# Patient Record
Sex: FEMALE | Race: WHITE | NOT HISPANIC OR LATINO | Employment: PART TIME | URBAN - METROPOLITAN AREA
[De-identification: names, ages, dates, MRNs, and addresses within clinical notes are randomized per-mention and may not be internally consistent; named-entity substitution may affect disease eponyms.]

---

## 2024-03-04 ENCOUNTER — APPOINTMENT (OUTPATIENT)
Dept: OBSTETRICS AND GYNECOLOGY | Facility: CLINIC | Age: 34
End: 2024-03-04

## 2024-03-29 ENCOUNTER — APPOINTMENT (OUTPATIENT)
Dept: OBSTETRICS AND GYNECOLOGY | Facility: CLINIC | Age: 34
End: 2024-03-29

## 2024-05-14 ENCOUNTER — OFFICE VISIT (OUTPATIENT)
Dept: OBSTETRICS AND GYNECOLOGY | Facility: CLINIC | Age: 34
End: 2024-05-14

## 2024-05-14 VITALS
WEIGHT: 263 LBS | BODY MASS INDEX: 37.65 KG/M2 | SYSTOLIC BLOOD PRESSURE: 124 MMHG | HEIGHT: 70 IN | DIASTOLIC BLOOD PRESSURE: 80 MMHG

## 2024-05-14 DIAGNOSIS — R10.2 PELVIC PAIN: Primary | ICD-10-CM

## 2024-05-14 DIAGNOSIS — Z11.51 SCREENING FOR HUMAN PAPILLOMAVIRUS (HPV): ICD-10-CM

## 2024-05-14 DIAGNOSIS — R10.31 RIGHT LOWER QUADRANT ABDOMINAL PAIN: ICD-10-CM

## 2024-05-14 DIAGNOSIS — Z01.419 WELL FEMALE EXAM WITH ROUTINE GYNECOLOGICAL EXAM: ICD-10-CM

## 2024-05-14 DIAGNOSIS — R22.32 AXILLARY MASS, LEFT: ICD-10-CM

## 2024-05-14 PROCEDURE — 1036F TOBACCO NON-USER: CPT | Performed by: NURSE PRACTITIONER

## 2024-05-14 PROCEDURE — 87624 HPV HI-RISK TYP POOLED RSLT: CPT

## 2024-05-14 PROCEDURE — 99385 PREV VISIT NEW AGE 18-39: CPT | Performed by: NURSE PRACTITIONER

## 2024-05-14 PROCEDURE — 88175 CYTOPATH C/V AUTO FLUID REDO: CPT

## 2024-05-14 PROCEDURE — 87491 CHLMYD TRACH DNA AMP PROBE: CPT

## 2024-05-14 PROCEDURE — 87591 N.GONORRHOEAE DNA AMP PROB: CPT

## 2024-05-14 NOTE — PROGRESS NOTES
Subjective   Giselle Tariq is a 34 y.o. female who is here for a routine exam. Periods are regular every 21-28 days, lasting 6 days. Pt states at the heaviest she is changing a pad every 2-3 hours. Dysmenorrhea:severe, occurring throughout cycle. Pain mostly associate with ovulation pain. Cyclic symptoms include none. No intermenstrual bleeding, spotting, or discharge.  C/O RLQ pain with ovulation and pain that radiates to her right hip.   C/O Urgency and possible urinary retention. No fever/no chills/no N&V. This symptoms has been present for months.   Hx of no GYN surgeries.   Hx of colposcopy years ago.   No hx of pregnancies.   Single partner for 7+ years.   Current contraception: condoms    History of abnormal Pap smear: yes - [id number]  Family history of uterine or ovarian cancer: no  Regular self breast exam: no    History of abnormal mammogram: no  Family history of breast cancer: no    Last pap: 2019 WNL, 2017 WNL  Previous hx of abnormal paps     Review of Systems:    Constitutional: Negative.    HENT: Negative.     Eyes: Negative.    Respiratory: Negative.     Cardiovascular: Negative.    Gastrointestinal: Negative.    Endocrine: Negative.    Genitourinary: Negative.    Musculoskeletal: Negative.    Skin: Negative.    Allergic/Immunologic: Negative.    Neurological: Negative.    Hematological: Negative.    Psychiatric/Behavioral: Negative.       Past Medical History:   Diagnosis Date    Abnormal Pap smear of cervix Unsure, its been quite a few years    Encounter for screening for malignant neoplasm of cervix     Encounter for screening for malignant neoplasm of cervix    Fatty (change of) liver, not elsewhere classified     Non-alcoholic fatty liver disease      Past Surgical History:   Procedure Laterality Date    OTHER SURGICAL HISTORY  11/10/2015    Treatment Of Foot Fracture      Menstrual History:  OB History          0    Para   0    Term   0       0    AB   0    Living   0          "SAB   0    IAB   0    Ectopic   0    Multiple   0    Live Births   0                  Patient's last menstrual period was 05/08/2024 (exact date).         Review of Systems    Objective   /80   Ht 1.778 m (5' 10\")   Wt 119 kg (263 lb)   LMP 05/08/2024 (Exact Date)   BMI 37.74 kg/m²     Exam:   Constitutional; alert with no acute distress.  Well-nourished.      Neck: No asymmetry noted.  Thyroid without enlargement.  No palpable nodules or masses of concern.    Cardiovascular: Heart regular rate and rhythm, normal S1 and S2    Pulmonary: No respiratory distress, lungs clear to auscultate bilaterally    Chest/breast exam: Appearance bilaterally normal, without asymmetry of concern, without skin lesions or nipple discharge.  Palpation of the right breast-no palpable masses no lymphadenopathy of the axilla. Left breast axially tail with palpable mass 1 cm, questionable lymph node verses breast mass. Tender to palpate. 1 cm x 1cm, firm     Abdomen: Soft, nontender, no abdominal masses palpated    Genitourinary:  Palpation of the lymph nodes of the groin-no inguinal lymphadenopathy  External genitalia/perianal: Without lesions normal in appearance   Urethra: In appearance without lesions Bladder: non-tender to palpate  Vagina: Without lesions including Bartholin, urethra, [place]'s glands within normal limits all WNL   Cervix: Normal in appearance without lesions, no cervical motion tenderness to palpation  Uterus: Without enlargement, mobile, nontender to palpate  Bilateral adnexa:  without masses, tender to palpate bilaterally, Right > Left     Skin: Normal skin color and pigmentation    Psychiatric: Alert and oriented x3. Affect normal to patient baseline.  Mood: appropriate       Assessment/Plan   Diagnoses and all orders for this visit:  Pelvic pain  -     US PELVIS TRANSABDOMINAL WITH TRANSVAGINAL; Future  -     THINPREP PAP  Well female exam with routine gynecological exam  -     THINPREP PAP  Screening for " human papillomavirus (HPV)  -     THINPREP PAP  Right lower quadrant abdominal pain  -     US PELVIS TRANSABDOMINAL WITH TRANSVAGINAL; Future  Axillary mass, left  -     BI mammo bilateral diagnostic tomosynthesis; Future  -     BI US breast limited left; Future      No follow-ups on file.     Pap plan: Pending pap, plan co-testing every 5 years   STI screening annually and prn if needed   Contraception discussed  Safe sex discussed   RTO 1 year annual exam, prn   Mammogram screening evaluation     ABD pain:  Pending ultrasound   Follow up visit with Physician   R/O GC/CT with pap     Breast lump- left breast axilla   Follow up in 3 months pending WNL jackie Stevenson, APRN-CNM, APRN-CNP

## 2024-05-16 LAB
C TRACH RRNA SPEC QL NAA+PROBE: NEGATIVE
N GONORRHOEA DNA SPEC QL PROBE+SIG AMP: NEGATIVE

## 2024-05-28 ENCOUNTER — HOSPITAL ENCOUNTER (OUTPATIENT)
Dept: RADIOLOGY | Facility: HOSPITAL | Age: 34
Discharge: HOME | End: 2024-05-28

## 2024-05-28 ENCOUNTER — HOSPITAL ENCOUNTER (OUTPATIENT)
Dept: RADIOLOGY | Facility: HOSPITAL | Age: 34
Discharge: HOME | End: 2024-05-28
Payer: OTHER GOVERNMENT

## 2024-05-28 DIAGNOSIS — R22.32 AXILLARY MASS, LEFT: ICD-10-CM

## 2024-05-28 DIAGNOSIS — R10.2 PELVIC PAIN: ICD-10-CM

## 2024-05-28 DIAGNOSIS — R10.31 RIGHT LOWER QUADRANT ABDOMINAL PAIN: ICD-10-CM

## 2024-05-28 PROCEDURE — 77062 BREAST TOMOSYNTHESIS BI: CPT

## 2024-05-28 PROCEDURE — 76642 ULTRASOUND BREAST LIMITED: CPT | Mod: LEFT SIDE | Performed by: RADIOLOGY

## 2024-05-28 PROCEDURE — 76856 US EXAM PELVIC COMPLETE: CPT

## 2024-05-28 PROCEDURE — 76830 TRANSVAGINAL US NON-OB: CPT | Performed by: STUDENT IN AN ORGANIZED HEALTH CARE EDUCATION/TRAINING PROGRAM

## 2024-05-28 PROCEDURE — 76856 US EXAM PELVIC COMPLETE: CPT | Performed by: STUDENT IN AN ORGANIZED HEALTH CARE EDUCATION/TRAINING PROGRAM

## 2024-05-28 PROCEDURE — 77062 BREAST TOMOSYNTHESIS BI: CPT | Mod: LEFT SIDE | Performed by: RADIOLOGY

## 2024-05-28 PROCEDURE — 77066 DX MAMMO INCL CAD BI: CPT | Mod: LEFT SIDE | Performed by: RADIOLOGY

## 2024-05-28 PROCEDURE — 76642 ULTRASOUND BREAST LIMITED: CPT | Mod: LT

## 2024-05-30 LAB

## 2024-06-10 ENCOUNTER — APPOINTMENT (OUTPATIENT)
Dept: OBSTETRICS AND GYNECOLOGY | Facility: CLINIC | Age: 34
End: 2024-06-10

## 2024-06-24 ENCOUNTER — APPOINTMENT (OUTPATIENT)
Dept: OBSTETRICS AND GYNECOLOGY | Facility: CLINIC | Age: 34
End: 2024-06-24

## 2024-08-12 ENCOUNTER — APPOINTMENT (OUTPATIENT)
Dept: OBSTETRICS AND GYNECOLOGY | Facility: CLINIC | Age: 34
End: 2024-08-12

## 2024-08-12 VITALS
HEIGHT: 70 IN | SYSTOLIC BLOOD PRESSURE: 124 MMHG | DIASTOLIC BLOOD PRESSURE: 88 MMHG | WEIGHT: 261 LBS | BODY MASS INDEX: 37.37 KG/M2

## 2024-08-12 DIAGNOSIS — R93.89 THICKENED ENDOMETRIUM: ICD-10-CM

## 2024-08-12 DIAGNOSIS — N64.4 BREAST PAIN, LEFT: ICD-10-CM

## 2024-08-12 DIAGNOSIS — R22.32 AXILLARY MASS, LEFT: Primary | ICD-10-CM

## 2024-08-12 DIAGNOSIS — N93.9 ABNORMAL UTERINE BLEEDING (AUB): ICD-10-CM

## 2024-08-12 DIAGNOSIS — N63.32 MASS OF AXILLARY TAIL OF LEFT BREAST: ICD-10-CM

## 2024-08-12 PROCEDURE — 1036F TOBACCO NON-USER: CPT | Performed by: NURSE PRACTITIONER

## 2024-08-12 PROCEDURE — 3008F BODY MASS INDEX DOCD: CPT | Performed by: NURSE PRACTITIONER

## 2024-08-12 PROCEDURE — 99214 OFFICE O/P EST MOD 30 MIN: CPT | Performed by: NURSE PRACTITIONER

## 2024-08-12 NOTE — PROGRESS NOTES
Giselle Tariq is a 34 y.o. year old female patient for follow up on previous mass on exam.  She presents for concern of a breast problem that was previously evaluated.   Previously noted: Left breast axially tail with palpable mass 1 cm, questionable lymph node verses breast mass. Tender to palpate. 1 cm x 1cm, firm   Since her last exam, she states she noticed breast pain  Previous imaging included: mammogram and ultrasound.   The reports from this evaluation were reviewed with patient.   She is agreeable to a follow up breast exam     In addition pt would like to review her pelvic ultrasound. She was unable to keep her follow up with the physician.     Cancer-related family history is not on file.   Past Medical History:   Diagnosis Date    Abnormal Pap smear of cervix Unsure, its been quite a few years    Encounter for screening for malignant neoplasm of cervix     Encounter for screening for malignant neoplasm of cervix    Fatty (change of) liver, not elsewhere classified     Non-alcoholic fatty liver disease      Past Surgical History:   Procedure Laterality Date    MULTIPLE TOOTH EXTRACTIONS      WISDOM TOOTH EXTRACTION          Exam:   Constitutional; alert with no acute distress.  Well-nourished.      Chest/breast exam: Appearance bilaterally normal, without asymmetry of concern, without skin lesions or nipple discharge.  Palpation of the right breast-no palpable masses no lymphadenopathy of the axilla. Left breast axially tail with palpable mass 1 cm, questionable lymph node verses breast mass. Tender to palpate. 1 cm x 1cm, firm     Abdomen: Soft, nontender, no abdominal masses palpated  Physical Exam  Genitourinary:   Breasts:     Right: Present. No swelling, bleeding, inverted nipple, mass, nipple discharge, skin change, tenderness or breast implant.      Left: Present. Mass and tenderness present. No swelling, bleeding, inverted nipple, nipple discharge, skin change or breast implant.   Chest:        Lymphadenopathy:      Upper Body:      Right upper body: No axillary adenopathy.        Same as above. C/W last exam lump smaller than 1x1cm however still palpable and pt has pain at this location.     Review of Systems     Problem List Items Addressed This Visit    None  Visit Diagnoses       Axillary mass, left    -  Primary    Relevant Orders    Referral to Breast Surgery    Mass of axillary tail of left breast        Relevant Orders    Referral to Breast Surgery    Breast pain, left        Relevant Orders    Referral to Breast Surgery    Thickened endometrium        Abnormal uterine bleeding (AUB)               * No order type specified *  No follow-ups on file.       Discussed RTO for EMB in 1-2 weeks.   Motrin prior to coming in.   Denies medical intervention for AUB. Pending nothing worsened. Declines IUD (Mirena). Declines surgical consult if everything is WNL.     Antonieta Stevenson, APRN-CNM, APRN-CNP

## 2024-08-28 ENCOUNTER — TELEPHONE (OUTPATIENT)
Dept: OBSTETRICS AND GYNECOLOGY | Facility: CLINIC | Age: 34
End: 2024-08-28
Payer: OTHER GOVERNMENT

## 2024-08-28 NOTE — TELEPHONE ENCOUNTER
PLEASE CALL EDWIGE BACK REGARDING WHAT MIGHT THE SURGEON DO AT APPT (SHE STATES SHE WILL TRY TO GET A HOLD OF THE REFERRED SURGEON ALSO. SHE STATES REECE WAS BI 1 IN MAY. SHE JUST MIGHT NOT BE COVERED FOR A BIOPSY DUE TO LEVEL. IS PT HAVING MORE PAIN?    FYI: ACCORDING TO [facility] PT'S SURGEON'S FOLLOW UP APPT NEEDS RESCHEDULED.    CALL [phone] EXT [phone]

## 2024-09-04 ENCOUNTER — APPOINTMENT (OUTPATIENT)
Dept: OBSTETRICS AND GYNECOLOGY | Facility: CLINIC | Age: 34
End: 2024-09-04
Payer: OTHER GOVERNMENT

## 2024-09-04 VITALS — SYSTOLIC BLOOD PRESSURE: 120 MMHG | BODY MASS INDEX: 37.88 KG/M2 | WEIGHT: 264 LBS | DIASTOLIC BLOOD PRESSURE: 82 MMHG

## 2024-09-04 DIAGNOSIS — N93.9 ABNORMAL UTERINE BLEEDING (AUB): ICD-10-CM

## 2024-09-04 DIAGNOSIS — R93.89 THICKENED ENDOMETRIUM: ICD-10-CM

## 2024-09-04 LAB — PREGNANCY TEST URINE, POC: NEGATIVE

## 2024-09-04 PROCEDURE — 81025 URINE PREGNANCY TEST: CPT | Performed by: NURSE PRACTITIONER

## 2024-09-04 PROCEDURE — 58100 BIOPSY OF UTERUS LINING: CPT | Performed by: NURSE PRACTITIONER

## 2024-09-04 NOTE — PROGRESS NOTES
Subjective:    Pt presents for an endometrial bx.   Discussed procedure in length and reviewed r/b. Consent was obtained.   PT agrees to proceed with endometrial bx for the dx of thickened endometrium on ultrasound and menorrhagia.   Allergies reviewed with patient.       Endometrial biopsy    Date/Time: 9/5/2024 10:11 AM    Performed by: DAMION Neal APRN-CNP  Authorized by: DAMION Neal APRN-CNP    Consent:     Consent obtained: written    Consent given by: patient    Risks discussed: bleeding, infection, pain and need for repeat procedure    Alternatives discussed: observation, referral and alternative treatment    Patient agrees, verbalizes understanding, and wants to proceed: yes    Indications:     Indications: abnormal uterine bleeding and thickened endometrium    Pre-procedure:     Urine pregnancy test: negative      Premeds: NSAID and acetaminophen    Procedure:     A bimanual exam was performed: no      Prepped with: Betadine    Tenaculum used: no      A local block was performed: no      Cervix dilated: no    Findings:     Cervix: normal      Uterus depth by sound (cm): 8    Specimen collected: specimen collected and sent to pathology      Patient tolerance: tolerated well, no immediate complications         Plan:    Giselle was seen today for endometrial biopsy.  Diagnoses and all orders for this visit:  Abnormal uterine bleeding (AUB)  -     POCT pregnancy, urine manually resulted  -     Surgical Pathology Exam  Thickened endometrium  -     POCT pregnancy, urine manually resulted  -     Surgical Pathology Exam  Other orders  -     Endometrial biopsy       Reviewed procedure and follow up.   Will await bx from pathology and need for further management.  Patient agrees to RTO or discuss findings via telephone after results have returned. Scheduled with a physician in 10/2024 for her to have a consult to discuss surgical options, but is planning expectant management.    Discharge instructions reviewed including: wearing pads only, avoiding intercourse or tampon use.   Advised to call with concerns of heavy bleeding or s/s of infection.   Patient verbalized understanding of management plan and was d/c to home in stable condition.    Keep follow up for physician consult.     No follow-ups on file.     Antonieta Stevenson, EDU-JARON, APRN-CNP

## 2024-09-06 ENCOUNTER — APPOINTMENT (OUTPATIENT)
Dept: SURGICAL ONCOLOGY | Facility: HOSPITAL | Age: 34
End: 2024-09-06
Payer: OTHER GOVERNMENT

## 2024-09-17 LAB
LABORATORY COMMENT REPORT: NORMAL
PATH REPORT.FINAL DX SPEC: NORMAL
PATH REPORT.GROSS SPEC: NORMAL
PATH REPORT.RELEVANT HX SPEC: NORMAL
PATH REPORT.TOTAL CANCER: NORMAL

## 2024-09-19 ENCOUNTER — TELEPHONE (OUTPATIENT)
Dept: OBSTETRICS AND GYNECOLOGY | Facility: CLINIC | Age: 34
End: 2024-09-19
Payer: OTHER GOVERNMENT

## 2024-09-19 NOTE — TELEPHONE ENCOUNTER
PATIENT IS SCHEDULED 10/7 FOR FOLLOW UP FROM ENDO BX SHE IS NOW HAVING PRESSURE WITH HEAVINESS, FEELING LIKE HER INSIDES ARE WEIGHED DOWN WOULD LIKE TO KNOW IF SHE COULD BE SEEN SOONER

## 2024-09-26 NOTE — PROGRESS NOTES
Giselle Tariq female   1990 34 y.o.   [id number]      Chief Complaint  New patient, left breast mass     History Of Present Illness  Giselle Tariq is a 34 y.o.  female presenting for follow up of a left axillary tail mass that was palpated by her provider. Patient states she was unable to palpate any masses. She had imaging done shortly after seeing provider and imaging was negative. She denies breast biopsy or surgery. She denies family history breast cancer.    BREAST IMAGIN2024 bilateral diagnostic mammogram and left breast ultrasound for Questionable palpable area left breast tail of Mahmood and or axilla. BI-RADS Category 1.       REPRODUCTIVE HISTORY: menarche age 11, nulliparous,  no OCP's, premenopausal, LMP 2024, fatty breast tissue                                   FAMILY CANCER HISTORY:   None     Surgical History  She has a past surgical history that includes Robbins tooth extraction and Multiple tooth extractions.     Social History  She reports that she has never smoked. She has never used smokeless tobacco. She reports that she does not currently use alcohol. She reports that she does not use drugs.    Family History  Family History   Problem Relation Name Age of Onset    Hypertension Mother Mother     Hypertension Maternal Grandmother Grandmother     Stroke Maternal Grandmother Grandmother         Allergies  Amoxicillin, Ciprofloxacin, Penicillins, and Sulfa (sulfonamide antibiotics)    Medications  No current outpatient medications      REVIEW OF SYSTEMS    Constitutional:  Negative for appetite change, fatigue, fever and unexpected weight change.   HENT:  Negative for ear pain, hearing loss, nosebleeds, sore throat and trouble swallowing.    Eyes:  Negative for discharge, itching and visual disturbance.   Respiratory:  Negative for cough, chest tightness and shortness of breath.    Cardiovascular:  Negative for chest pain, palpitations and leg swelling.   Breast: as indicated in  HPI  Gastrointestinal:  Negative for abdominal pain, constipation, diarrhea and nausea.   Endocrine: Negative for cold intolerance and heat intolerance.   Genitourinary:  Negative for dysuria, frequency, hematuria, pelvic pain and vaginal bleeding.   Musculoskeletal:  Negative for arthralgias, back pain, gait problem, joint swelling and myalgias.   Skin:  Negative for color change and rash.   Allergic/Immunologic: Negative for environmental allergies and food allergies.   Neurological:  Negative for dizziness, tremors, speech difficulty, weakness, numbness and headaches.   Hematological:  Does not bruise/bleed easily.   Psychiatric/Behavioral:  Negative for agitation, dysphoric mood and sleep disturbance. The patient is not nervous/anxious.         Past Medical History  She has a past medical history of Abnormal Pap smear of cervix (Unsure, its been quite a few years), Encounter for screening for malignant neoplasm of cervix, and Fatty (change of) liver, not elsewhere classified.     Physical Exam  Patient is alert and oriented x3 and in a relaxed and appropriate mood. Her gait is steady and hand grasps are equal. Sclera is clear. The breasts are nearly symmetrical. The tissue is soft without palpable abnormalities, discrete nodules or masses. The skin and nipples appear normal. There is no cervical, supraclavicular or axillary lymphadenopathy. Heart rate and rhythm normal, S1 and S2 appreciated. The lungs are clear to auscultation bilaterally. Abdomen is soft and non-tender.       Physical Exam     Last Recorded Vitals  Vitals:    09/27/24 1019   BP: (!) 132/94   Pulse: 87   Resp: 18   Temp: 36.9 °C (98.4 °F)   SpO2: 97%       Relevant Results   Time was spent viewing digital images of the radiology testing with the patient. I explained the results in depth, along with suggested explanation for follow up recommendations based on the testing results. BI-RADS Category 1       Assessment/Plan       Reviewed breast  imaging with breast radiologist and agrees with category 1. Normal clinical exam and imaging. Fatty breast tissue, no family history breast cancer.     Patient Discussion/Summary  Your clinical examination and imaging are normal. You no longer need to be seen by a breast specialist for an annual physical breast examination. It is important to continue annual screening mammograms and breast exams through your primary care provider. Please return to see me if you have a new breast problem or abnormal mammogram. It has been a pleasure having you as a patient.     You can see your health information, review clinical summaries from office visits & test results online when you follow your health with MY  Chart, a personal health record. To sign up go to [email or web address]. If you need assistance with signing up or trouble getting into your account call [employer] Patient Line 24/7 at [phone].    My office phone number is [phone] if you need to get in touch with me or have additional questions or concerns. Thank you for choosing [facility] and trusting me as your healthcare provider. I am honored to be a provider on your health care team and I remain dedicated to helping you achieve your health goals.       Nataliia Storey, APRN-CNP

## 2024-09-27 ENCOUNTER — OFFICE VISIT (OUTPATIENT)
Dept: SURGICAL ONCOLOGY | Facility: HOSPITAL | Age: 34
End: 2024-09-27
Payer: OTHER GOVERNMENT

## 2024-09-27 VITALS
OXYGEN SATURATION: 97 % | HEIGHT: 70 IN | WEIGHT: 262.13 LBS | SYSTOLIC BLOOD PRESSURE: 132 MMHG | BODY MASS INDEX: 37.53 KG/M2 | TEMPERATURE: 98.4 F | RESPIRATION RATE: 18 BRPM | HEART RATE: 87 BPM | DIASTOLIC BLOOD PRESSURE: 94 MMHG

## 2024-09-27 DIAGNOSIS — N64.4 BREAST PAIN, LEFT: ICD-10-CM

## 2024-09-27 DIAGNOSIS — R22.32 AXILLARY MASS, LEFT: ICD-10-CM

## 2024-09-27 DIAGNOSIS — N63.32 MASS OF AXILLARY TAIL OF LEFT BREAST: ICD-10-CM

## 2024-09-27 PROCEDURE — 3008F BODY MASS INDEX DOCD: CPT

## 2024-09-27 PROCEDURE — 99214 OFFICE O/P EST MOD 30 MIN: CPT

## 2024-09-27 PROCEDURE — 99204 OFFICE O/P NEW MOD 45 MIN: CPT

## 2024-09-27 PROCEDURE — 1036F TOBACCO NON-USER: CPT

## 2024-09-27 ASSESSMENT — PAIN SCALES - GENERAL: PAINLEVEL: 0-NO PAIN

## 2024-09-27 NOTE — PATIENT INSTRUCTIONS
Your clinical examination and imaging are normal. You no longer need to be seen by a breast specialist for an annual physical breast examination. It is important to continue annual screening mammograms and breast exams through your primary care provider. Please return to see me if you have a new breast problem or abnormal mammogram. It has been a pleasure having you as a patient.     You can see your health information, review clinical summaries from office visits & test results online when you follow your health with MY  Chart, a personal health record. To sign up go to [email or web address]. If you need assistance with signing up or trouble getting into your account call [employer] Patient Line 24/7 at [phone].    My office phone number is [phone] if you need to get in touch with me or have additional questions or concerns. Thank you for choosing [facility] and trusting me as your healthcare provider. I am honored to be a provider on your health care team and I remain dedicated to helping you achieve your health goals.

## 2024-09-30 ENCOUNTER — APPOINTMENT (OUTPATIENT)
Dept: OBSTETRICS AND GYNECOLOGY | Facility: CLINIC | Age: 34
End: 2024-09-30
Payer: OTHER GOVERNMENT

## 2024-10-07 ENCOUNTER — APPOINTMENT (OUTPATIENT)
Dept: OBSTETRICS AND GYNECOLOGY | Facility: CLINIC | Age: 34
End: 2024-10-07
Payer: OTHER GOVERNMENT

## 2024-10-07 VITALS — BODY MASS INDEX: 37.59 KG/M2 | DIASTOLIC BLOOD PRESSURE: 82 MMHG | WEIGHT: 262 LBS | SYSTOLIC BLOOD PRESSURE: 130 MMHG

## 2024-10-07 DIAGNOSIS — N93.9 ABNORMAL UTERINE BLEEDING: Primary | ICD-10-CM

## 2024-10-07 PROCEDURE — 1036F TOBACCO NON-USER: CPT | Performed by: STUDENT IN AN ORGANIZED HEALTH CARE EDUCATION/TRAINING PROGRAM

## 2024-10-07 PROCEDURE — 99213 OFFICE O/P EST LOW 20 MIN: CPT | Performed by: STUDENT IN AN ORGANIZED HEALTH CARE EDUCATION/TRAINING PROGRAM

## 2024-10-07 NOTE — PROGRESS NOTES
Subjective   Patient ID: Giselle Tarqi is a 34 y.o. female who presents for est patient gyn visit (Irregular bleeding/9-4-24 EMB with Antonia Stevenson ).  Patient is having abnormal uterine bleeding. It is irregular and persistent. It can be heavy. She will have occasional RLQ pain. Not prohibitive to work.  She had an EMB. After this, she had severe pressure that lasted for a week. It started again with her period.  She has not done well OCPs in the past due to severe side effects.    No fevers, chills. No HA/vision changes. No RUQ pain, n/v. No chest pain or SOB. No calf pain.        Review of Systems   All other systems reviewed and are negative.      Past Medical History:   Diagnosis Date    Abnormal Pap smear of cervix Unsure, its been quite a few years    Encounter for screening for malignant neoplasm of cervix     Encounter for screening for malignant neoplasm of cervix    Fatty (change of) liver, not elsewhere classified     Non-alcoholic fatty liver disease     Past Surgical History:   Procedure Laterality Date    MULTIPLE TOOTH EXTRACTIONS      WISDOM TOOTH EXTRACTION       Social History     Socioeconomic History    Marital status: Significant Other     Spouse name: Not on file    Number of children: Not on file    Years of education: Not on file    Highest education level: Not on file   Occupational History    Occupation: receptioist at  office   Tobacco Use    Smoking status: Never    Smokeless tobacco: Never   Vaping Use    Vaping status: Never Used   Substance and Sexual Activity    Alcohol use: Not Currently    Drug use: Never    Sexual activity: Yes     Partners: Male     Birth control/protection: Condom Male   Other Topics Concern    Not on file   Social History Narrative    Not on file     Social Determinants of Health     Financial Resource Strain: Not on file   Food Insecurity: Not on file   Transportation Needs: Not on file   Physical Activity: Not on file   Stress: Not on file    Social Connections: Not on file   Intimate Partner Violence: Not on file   Housing Stability: Not on file       Objective   Physical Exam  General: A&Ox3  Head: Normocephalic, atraumatic  Heart/Lungs: RRR, No murmurs, gallops, or rubs. Lungs are CTAB.  Abdomen: Soft, nontender. BS+4. No bruising or masses.  Lower Extremities: No lower extremity Edema no palpable cords.     A chaperone was present for the exam.    Assessment/Plan   Problem List Items Addressed This Visit       Abnormal uterine bleeding - Primary    Overview     TVUS reassuring  EMB wnl  Discussed options in detail. Patient would like expectant management for now. We discussed medical and surgical options in detail. Discussed that if her bleeding becomes very heavy, she may become anemic. If periods continue to be heavy. Recommend routine CBC annually.              El Freedman MD 10/07/24 11:10 PM

## 2025-05-19 ENCOUNTER — APPOINTMENT (OUTPATIENT)
Dept: OBSTETRICS AND GYNECOLOGY | Facility: CLINIC | Age: 35
End: 2025-05-19
Payer: OTHER GOVERNMENT

## 2025-05-19 VITALS — SYSTOLIC BLOOD PRESSURE: 132 MMHG | BODY MASS INDEX: 36.88 KG/M2 | DIASTOLIC BLOOD PRESSURE: 76 MMHG | WEIGHT: 257 LBS

## 2025-05-19 DIAGNOSIS — Z01.419 WELL FEMALE EXAM WITH ROUTINE GYNECOLOGICAL EXAM: Primary | ICD-10-CM

## 2025-05-19 DIAGNOSIS — R10.2 PELVIC PAIN: ICD-10-CM

## 2025-05-19 PROCEDURE — 1036F TOBACCO NON-USER: CPT | Performed by: NURSE PRACTITIONER

## 2025-05-19 PROCEDURE — 99395 PREV VISIT EST AGE 18-39: CPT | Performed by: NURSE PRACTITIONER

## 2025-05-19 SDOH — ECONOMIC STABILITY: TRANSPORTATION INSECURITY
IN THE PAST 12 MONTHS, HAS THE LACK OF TRANSPORTATION KEPT YOU FROM MEDICAL APPOINTMENTS OR FROM GETTING MEDICATIONS?: NO

## 2025-05-19 SDOH — ECONOMIC STABILITY: FOOD INSECURITY: WITHIN THE PAST 12 MONTHS, YOU WORRIED THAT YOUR FOOD WOULD RUN OUT BEFORE YOU GOT MONEY TO BUY MORE.: NEVER TRUE

## 2025-05-19 SDOH — ECONOMIC STABILITY: FOOD INSECURITY: WITHIN THE PAST 12 MONTHS, THE FOOD YOU BOUGHT JUST DIDN'T LAST AND YOU DIDN'T HAVE MONEY TO GET MORE.: NEVER TRUE

## 2025-05-19 SDOH — ECONOMIC STABILITY: TRANSPORTATION INSECURITY
IN THE PAST 12 MONTHS, HAS LACK OF TRANSPORTATION KEPT YOU FROM MEETINGS, WORK, OR FROM GETTING THINGS NEEDED FOR DAILY LIVING?: NO

## 2025-05-19 NOTE — PROGRESS NOTES
Subjective   Giselle Tariq is a 35 y.o. female who is here for a routine exam. Periods are regular every 21-28 days, lasting 6 days. Bleeding for 6-7 days, occasional heavy bleeding.   Scheduled colonoscopy in the next few weeks.     Hx of no GYN surgeries.   Hx of colposcopy years ago.   No hx of pregnancies.   Single partner - unchanged  Current contraception: condoms    History of abnormal Pap smear: yes - [id number]  Family history of uterine or ovarian cancer: no  Regular self breast exam: no    History of abnormal mammogram: no  Family history of breast cancer: no    Last pap:  WNL HPV negative/ WNL, 2017 WNL,   Previous hx of abnormal paps     Review of Systems:    Constitutional: Negative.    HENT: Negative.     Eyes: Negative.    Respiratory: Negative.     Cardiovascular: Negative.    Gastrointestinal: Negative.    Endocrine: Negative.    Genitourinary: Negative.    Musculoskeletal: Negative.    Skin: Negative.    Allergic/Immunologic: Negative.    Neurological: Negative.    Hematological: Negative.    Psychiatric/Behavioral: Negative.       Past Medical History:   Diagnosis Date    Abnormal Pap smear of cervix Unsure, its been quite a few years    Encounter for screening for malignant neoplasm of cervix     Encounter for screening for malignant neoplasm of cervix    Fatty (change of) liver, not elsewhere classified     Non-alcoholic fatty liver disease      Past Surgical History:   Procedure Laterality Date    MULTIPLE TOOTH EXTRACTIONS      WISDOM TOOTH EXTRACTION        Menstrual History:  OB History          0    Para   0    Term   0       0    AB   0    Living   0         SAB   0    IAB   0    Ectopic   0    Multiple   0    Live Births   0                  No LMP recorded.         Review of Systems    Objective   /76   Wt 117 kg (257 lb)   BMI 36.88 kg/m²     Exam:   Constitutional; alert with no acute distress.  Well-nourished.      Neck: No asymmetry noted.  Thyroid  without enlargement.  No palpable nodules or masses of concern.    Cardiovascular: Heart regular rate and rhythm, normal S1 and S2    Pulmonary: No respiratory distress, lungs clear to auscultate bilaterally    Chest/breast exam: Appearance bilaterally normal, without asymmetry of concern, without skin lesions or nipple discharge.  Palpation of the right breast-no palpable masses no lymphadenopathy of the axilla.     Abdomen: Soft, nontender, no abdominal masses palpated    Genitourinary:  Palpation of the lymph nodes of the groin-no inguinal lymphadenopathy  External genitalia/perianal: Without lesions normal in appearance   Urethra: In appearance without lesions Bladder: non-tender to palpate  Vagina: Without lesions including Bartholin, urethra, [place]'s glands within normal limits all WNL   Cervix: Normal in appearance without lesions, no cervical motion tenderness to palpation  Uterus: Without enlargement, mobile, nontender to palpate  Bilateral adnexa:  without masses, bilateral tenderness     Skin: Normal skin color and pigmentation    Psychiatric: Alert and oriented x3. Affect normal to patient baseline.  Mood: appropriate       Assessment/Plan   Diagnoses and all orders for this visit:  Well female exam with routine gynecological exam  Pelvic pain        No follow-ups on file.     Pap plan: WNL 2024 plan cotesting in 3-5 years.   STI screening annually and prn if needed   Contraception discussed  Safe sex discussed   RTO 1 year annual exam, prn   Mammogram screening evaluation   Improved acute pelvic pain, with exam bilateral tenderness- following with colorectal evaluation       Antonieta Stevenson, APRN-CNM, APRN-CNP

## 2025-10-02 ENCOUNTER — APPOINTMENT (OUTPATIENT)
Facility: CLINIC | Age: 35
End: 2025-10-02

## 2026-05-19 ENCOUNTER — APPOINTMENT (OUTPATIENT)
Dept: OBSTETRICS AND GYNECOLOGY | Facility: CLINIC | Age: 36
End: 2026-05-19